# Patient Record
(demographics unavailable — no encounter records)

---

## 2025-02-12 NOTE — HISTORY OF PRESENT ILLNESS
[Patient reported PAP Smear was normal] : Patient reported PAP Smear was normal [LMP unknown] : LMP unknown [postmenopausal] : postmenopausal [Y] : Positive pregnancy history [Currently Active] : currently active [Men] : men [No] : No [Mammogramdate] : 2024 [TextBox_19] : SMI [BreastSonogramDate] : 2024 [TextBox_25] : SINDHU [PapSmeardate] : 2023 [BoneDensityDate] : 2024 [ColonoscopyDate] : 2023 [LMPDate] :  [PGHxTotal] : 4 [[facility]] : 3 [[facility] CenterxLiving] : 2 [PGHxABSpont] : 1 [FreeTextEntry1] : 3  SECTION